# Patient Record
Sex: MALE | Race: WHITE | NOT HISPANIC OR LATINO | URBAN - METROPOLITAN AREA
[De-identification: names, ages, dates, MRNs, and addresses within clinical notes are randomized per-mention and may not be internally consistent; named-entity substitution may affect disease eponyms.]

---

## 2017-02-17 ENCOUNTER — IMPORTED ENCOUNTER (OUTPATIENT)
Dept: URBAN - METROPOLITAN AREA CLINIC 1 | Facility: CLINIC | Age: 67
End: 2017-02-17

## 2017-02-17 PROBLEM — H25.813: Noted: 2017-02-17

## 2017-02-17 PROBLEM — H43.21: Noted: 2017-02-17

## 2017-02-17 PROBLEM — H53.2: Noted: 2017-02-17

## 2017-02-17 PROCEDURE — 92004 COMPRE OPH EXAM NEW PT 1/>: CPT

## 2017-02-17 PROCEDURE — 92015 DETERMINE REFRACTIVE STATE: CPT

## 2017-02-17 NOTE — PATIENT DISCUSSION
1.  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 2.  Cataract OU: Observe for now without intervention. The patient was advised to contact us if any change or worsening of vision3. Asteroid Hyalosis OD- RD precautions 4. 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 Will observe at this time. Letter to PCP MRX for glasses given Return for an appointment in 1 yr 27 with Dr. Sis Murray.

## 2017-11-17 NOTE — PATIENT DISCUSSION
(H35.372) Puckering of macula, left eye - Assesment : Examination revealed ERM. Mild. - Plan : Monitor. Advised patient to call our office with decreased vision or increased symptoms. RV in 1 Year for Complete Exam, sooner with problems or changes in vision.

## 2018-11-19 NOTE — PATIENT DISCUSSION
(J72.202) Keratoconjunct sicca, not specified as Sjogren's, bilateral - Assesment : Examination revealed Dry Eye Syndrome ou. - Plan : Monitor for changes. Advised patient to call our office with decreased vision or increased symptoms.

## 2020-01-29 ENCOUNTER — IMPORTED ENCOUNTER (OUTPATIENT)
Dept: URBAN - METROPOLITAN AREA CLINIC 1 | Facility: CLINIC | Age: 70
End: 2020-01-29

## 2020-01-29 PROBLEM — H43.21: Noted: 2020-01-29

## 2020-01-29 PROBLEM — H02.403: Noted: 2020-01-29

## 2020-01-29 PROBLEM — H25.813: Noted: 2020-01-29

## 2020-01-29 PROCEDURE — 92015 DETERMINE REFRACTIVE STATE: CPT

## 2020-01-29 PROCEDURE — 92014 COMPRE OPH EXAM EST PT 1/>: CPT

## 2020-01-29 NOTE — PATIENT DISCUSSION
1.  Cataract OU -- Observe for now without intervention. The patient was advised to contact us if any change or worsening of vision2. Asteroid Hyalosis OD -- RD precautions. 3.  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 Will observe at this time. 4.  H/o diplopia secondary to HTN medicationReturn for an appointment in 1 year 30/glare with Dr. Yamilex Cruz.

## 2021-01-27 ENCOUNTER — IMPORTED ENCOUNTER (OUTPATIENT)
Dept: URBAN - METROPOLITAN AREA CLINIC 1 | Facility: CLINIC | Age: 71
End: 2021-01-27

## 2021-01-27 PROBLEM — H43.21: Noted: 2021-01-27

## 2021-01-27 PROBLEM — H02.403: Noted: 2021-01-27

## 2021-01-27 PROBLEM — H25.813: Noted: 2021-01-27

## 2021-01-27 PROCEDURE — 99214 OFFICE O/P EST MOD 30 MIN: CPT

## 2021-01-27 NOTE — PATIENT DISCUSSION
1.  Cataract OU -- No visual/glare complaints noted from patient today non-surgical at this time. Observe for now without intervention. The patient was advised to contact us if any change or worsening of vision2. Asteroid Hyalosis OD -- Stable. RD Precautions. 3.  Ptosis OU w/ Levator Dehiscense -- Continue to Observe. In future may consider referral for repair. 4.  H/o Diplopia -- Secondary to HTN medications. Patient deferred MRx at today's visit. Return for an appointment in 1 year 27 with Dr. Champ Welch.

## 2022-01-27 ENCOUNTER — IMPORTED ENCOUNTER (OUTPATIENT)
Dept: URBAN - METROPOLITAN AREA CLINIC 1 | Facility: CLINIC | Age: 72
End: 2022-01-27

## 2022-01-27 PROBLEM — H02.423: Noted: 2022-01-27

## 2022-01-27 PROBLEM — H43.21: Noted: 2022-01-27

## 2022-01-27 PROBLEM — H02.403: Noted: 2022-01-27

## 2022-01-27 PROBLEM — H25.813: Noted: 2022-01-27

## 2022-01-27 PROCEDURE — 99214 OFFICE O/P EST MOD 30 MIN: CPT

## 2022-01-27 PROCEDURE — 92015 DETERMINE REFRACTIVE STATE: CPT

## 2022-01-27 NOTE — PATIENT DISCUSSION
1.  Cataract OU --  Visually Significant secondary to glare. Discussed the risks benefits alternatives and limitations of cataract surgery. The patient stated a full understanding and a desire to proceed with the procedure. The patient will need to return for preop appointment with cataract measurements and to have any additional questions answered and start pre-operative eye drops as directed. ***PMG DID NOT SEE TODAY***Phaco PCL OS then OD Otherwise follow-up 6 months DFE/Glare 2. Asteroid Hyalosis OD -- Stable. RD Precautions. 3.  Ptosis OU w/ Levator Dehiscense -- Continue to Observe. In future may consider referral for repair. 4.  H/o Diplopia -- Secondary to HTN medications. Return for an appointment for 10 and [facility] and P. with Dr. Noah Wayne.

## 2022-04-01 ENCOUNTER — PRE-OP/H&P (OUTPATIENT)
Dept: URBAN - METROPOLITAN AREA CLINIC 1 | Facility: CLINIC | Age: 72
End: 2022-04-01

## 2022-04-01 VITALS
DIASTOLIC BLOOD PRESSURE: 92 MMHG | HEART RATE: 78 BPM | WEIGHT: 185 LBS | BODY MASS INDEX: 28.04 KG/M2 | HEIGHT: 68 IN | SYSTOLIC BLOOD PRESSURE: 145 MMHG

## 2022-04-01 DIAGNOSIS — H25.813: ICD-10-CM

## 2022-04-01 PROCEDURE — 92025 CPTRIZED CORNEAL TOPOGRAPHY: CPT

## 2022-04-01 PROCEDURE — 92012 INTRM OPH EXAM EST PATIENT: CPT

## 2022-04-01 PROCEDURE — 92136 OPHTHALMIC BIOMETRY: CPT

## 2022-04-01 ASSESSMENT — VISUAL ACUITY
OD_CC: 20/40 -2
OS_CC: J1
OD_CC: J1
OD_SC: J2
OD_BAT: 20/100
OS_CC: 20/40
OS_SC: J2
OS_BAT: 20/100

## 2022-04-01 ASSESSMENT — TONOMETRY
OD_IOP_MMHG: 17
OS_IOP_MMHG: 17

## 2022-04-01 NOTE — PATIENT DISCUSSION
(Ascan/HP) Cataract (EYE) -- Visually Significant (Secondary to glare), discussed the risks, benefits, alternatives, and limitations of cataract surgery. The patient stated a full understanding and a desire to proceed with the procedure. Discussed with patient if post-op drops are more than $120 for all three combined when filling at their Pharmacy, please call our office to request generic substitutions. Phaco PCL OS.

## 2022-04-02 ASSESSMENT — KERATOMETRY
OD_AXISANGLE2_DEGREES: 073
OD_AXISANGLE_DEGREES: 163
OD_K2POWER_DIOPTERS: 44.50
OS_K1POWER_DIOPTERS: 44.25
OS_K2POWER_DIOPTERS: 45.00
OS_AXISANGLE2_DEGREES: 064
OD_K1POWER_DIOPTERS: 44.75
OS_AXISANGLE_DEGREES: 154

## 2022-04-02 ASSESSMENT — TONOMETRY
OS_IOP_MMHG: 21
OD_IOP_MMHG: 20
OS_IOP_MMHG: 16
OS_IOP_MMHG: 20
OD_IOP_MMHG: 16
OD_IOP_MMHG: 19
OD_IOP_MMHG: 19
OS_IOP_MMHG: 22

## 2022-04-02 ASSESSMENT — VISUAL ACUITY
OD_GLARE: 20/100
OS_GLARE: 20/100
OS_SC: 20/25
OS_GLARE: 20/80
OD_SC: 20/50
OS_SC: 20/30
OD_SC: 20/40
OD_GLARE: 20/100
OD_SC: 20/30-2
OS_CC: J4
OD_CC: J1
OD_SC: 20/50
OD_GLARE: 20/100
OD_CC: J4
OS_GLARE: 20/80
OD_GLARE: 20/80
OD_CC: J4
OS_CC: J1
OS_CC: J4
OD_CC: J2
OS_SC: 20/30
OS_SC: 20/40
OS_GLARE: 20/80
OS_CC: J2

## 2022-04-06 ENCOUNTER — SURGERY/PROCEDURE (OUTPATIENT)
Dept: URBAN - METROPOLITAN AREA SURGERY 1 | Facility: SURGERY | Age: 72
End: 2022-04-06

## 2022-04-06 DIAGNOSIS — H25.813: ICD-10-CM

## 2022-04-06 PROCEDURE — 66984 XCAPSL CTRC RMVL W/O ECP: CPT

## 2022-04-07 ENCOUNTER — POST-OP (OUTPATIENT)
Dept: URBAN - METROPOLITAN AREA CLINIC 1 | Facility: CLINIC | Age: 72
End: 2022-04-07

## 2022-04-07 DIAGNOSIS — Z96.1: ICD-10-CM

## 2022-04-07 PROCEDURE — 99024 POSTOP FOLLOW-UP VISIT: CPT

## 2022-04-07 ASSESSMENT — TONOMETRY: OS_IOP_MMHG: 20

## 2022-04-07 ASSESSMENT — VISUAL ACUITY
OS_PH: 20/25
OS_SC: 20/60

## 2022-04-11 ENCOUNTER — PRE-OP/H&P (OUTPATIENT)
Dept: URBAN - METROPOLITAN AREA CLINIC 1 | Facility: CLINIC | Age: 72
End: 2022-04-11

## 2022-04-11 VITALS
WEIGHT: 180 LBS | HEIGHT: 68 IN | DIASTOLIC BLOOD PRESSURE: 86 MMHG | SYSTOLIC BLOOD PRESSURE: 136 MMHG | HEART RATE: 84 BPM | BODY MASS INDEX: 27.28 KG/M2

## 2022-04-11 DIAGNOSIS — Z96.1: ICD-10-CM

## 2022-04-11 DIAGNOSIS — H25.811: ICD-10-CM

## 2022-04-11 PROCEDURE — 92136 OPHTHALMIC BIOMETRY: CPT

## 2022-04-11 PROCEDURE — 92012 INTRM OPH EXAM EST PATIENT: CPT

## 2022-04-11 ASSESSMENT — TONOMETRY: OS_IOP_MMHG: 19

## 2022-04-11 ASSESSMENT — VISUAL ACUITY: OS_SC: 20/60

## 2022-04-11 NOTE — PATIENT DISCUSSION
Visually Significant secondary to glare, discussed the risks, benefits, alternatives, and limitations of cataract surgery. The patient stated a full understanding and a desire to proceed with the procedure. Discussed with patient if post-op drops are more than $120 for all three combined when filling at their Pharmacy, please call our office to request generic substitutions. Patient understands he will need specs post-op to achieve best corrected vision.

## 2022-04-20 ENCOUNTER — SURGERY/PROCEDURE (OUTPATIENT)
Dept: URBAN - METROPOLITAN AREA SURGERY 1 | Facility: SURGERY | Age: 72
End: 2022-04-20

## 2022-04-20 DIAGNOSIS — H25.811: ICD-10-CM

## 2022-04-20 PROCEDURE — 66984 XCAPSL CTRC RMVL W/O ECP: CPT

## 2022-04-21 ENCOUNTER — POST-OP (OUTPATIENT)
Dept: URBAN - METROPOLITAN AREA CLINIC 1 | Facility: CLINIC | Age: 72
End: 2022-04-21

## 2022-04-21 DIAGNOSIS — Z96.1: ICD-10-CM

## 2022-04-21 PROCEDURE — 99024 POSTOP FOLLOW-UP VISIT: CPT

## 2022-04-21 ASSESSMENT — VISUAL ACUITY
OD_SC: 20/40
OS_SC: 20/40
OS_PH: 20/25
OD_PH: 20/25

## 2022-04-21 ASSESSMENT — TONOMETRY
OD_IOP_MMHG: 18
OS_IOP_MMHG: 16

## 2022-04-21 NOTE — PATIENT DISCUSSION
Use Pred BID OD, Ocuflox TID OD, and Prolensa QD OD: Use all three gtts through completion of PO gtt chart regimen/ Per our instructions given to patient.

## 2022-05-12 ENCOUNTER — POST-OP (OUTPATIENT)
Dept: URBAN - METROPOLITAN AREA CLINIC 1 | Facility: CLINIC | Age: 72
End: 2022-05-12

## 2022-05-12 DIAGNOSIS — Z96.1: ICD-10-CM

## 2022-05-12 PROCEDURE — 99024 POSTOP FOLLOW-UP VISIT: CPT

## 2022-05-12 ASSESSMENT — KERATOMETRY
OD_K2POWER_DIOPTERS: 45.25
OS_AXISANGLE_DEGREES: 125
OD_AXISANGLE_DEGREES: 158
OS_K1POWER_DIOPTERS: 44.50
OS_K2POWER_DIOPTERS: 45.25
OD_K1POWER_DIOPTERS: 44.50
OS_AXISANGLE2_DEGREES: 35
OD_AXISANGLE2_DEGREES: 68

## 2022-05-12 ASSESSMENT — VISUAL ACUITY
OS_SC: 20/50
OD_SC: 20/60
OU_SC: J2
OS_SC: J2-2
OD_SC: J2-2

## 2022-05-12 ASSESSMENT — TONOMETRY
OD_IOP_MMHG: 16
OS_IOP_MMHG: 16

## 2024-02-15 ENCOUNTER — COMPREHENSIVE EXAM (OUTPATIENT)
Dept: URBAN - METROPOLITAN AREA CLINIC 1 | Facility: CLINIC | Age: 74
End: 2024-02-15

## 2024-02-15 DIAGNOSIS — H26.493: ICD-10-CM

## 2024-02-15 DIAGNOSIS — H02.403: ICD-10-CM

## 2024-02-15 DIAGNOSIS — H43.21: ICD-10-CM

## 2024-02-15 PROCEDURE — 99214 OFFICE O/P EST MOD 30 MIN: CPT

## 2024-02-15 PROCEDURE — 92015 DETERMINE REFRACTIVE STATE: CPT

## 2024-02-15 ASSESSMENT — VISUAL ACUITY
OD_BAT: 20/70
OD_CC: 20/30
OD_CC: J2
OS_BAT: 20/70
OS_CC: J2
OS_CC: 20/50

## 2024-02-15 ASSESSMENT — TONOMETRY
OD_IOP_MMHG: 13
OS_IOP_MMHG: 16

## 2024-04-05 ENCOUNTER — CLINIC PROCEDURE ONLY (OUTPATIENT)
Dept: URBAN - METROPOLITAN AREA CLINIC 1 | Facility: CLINIC | Age: 74
End: 2024-04-05

## 2024-04-05 DIAGNOSIS — H26.493: ICD-10-CM

## 2024-04-05 DIAGNOSIS — Z96.1: ICD-10-CM

## 2024-04-05 PROCEDURE — 66821 AFTER CATARACT LASER SURGERY: CPT

## 2024-04-19 ENCOUNTER — CLINIC PROCEDURE ONLY (OUTPATIENT)
Dept: URBAN - METROPOLITAN AREA CLINIC 1 | Facility: CLINIC | Age: 74
End: 2024-04-19

## 2024-04-19 DIAGNOSIS — Z96.1: ICD-10-CM

## 2024-04-19 DIAGNOSIS — H26.492: ICD-10-CM

## 2024-04-19 PROCEDURE — 66821 AFTER CATARACT LASER SURGERY: CPT | Mod: 79,LT

## 2024-05-03 ENCOUNTER — POST-OP (OUTPATIENT)
Dept: URBAN - METROPOLITAN AREA CLINIC 1 | Facility: CLINIC | Age: 74
End: 2024-05-03

## 2024-05-03 DIAGNOSIS — Z98.890: ICD-10-CM

## 2024-05-03 DIAGNOSIS — Z96.1: ICD-10-CM

## 2024-05-03 ASSESSMENT — VISUAL ACUITY
OD_CC: 20/20
OS_CC: 20/25

## 2025-02-03 ENCOUNTER — COMPREHENSIVE EXAM (OUTPATIENT)
Age: 75
End: 2025-02-03

## 2025-02-03 DIAGNOSIS — H43.21: ICD-10-CM

## 2025-02-03 DIAGNOSIS — H02.403: ICD-10-CM

## 2025-02-03 DIAGNOSIS — H02.052: ICD-10-CM

## 2025-02-03 DIAGNOSIS — Z96.1: ICD-10-CM

## 2025-02-03 PROCEDURE — 92015 DETERMINE REFRACTIVE STATE: CPT

## 2025-02-03 PROCEDURE — 99214 OFFICE O/P EST MOD 30 MIN: CPT
